# Patient Record
Sex: MALE | Race: WHITE | NOT HISPANIC OR LATINO | URBAN - METROPOLITAN AREA
[De-identification: names, ages, dates, MRNs, and addresses within clinical notes are randomized per-mention and may not be internally consistent; named-entity substitution may affect disease eponyms.]

---

## 2018-06-20 ENCOUNTER — OUTPATIENT (OUTPATIENT)
Dept: OUTPATIENT SERVICES | Facility: HOSPITAL | Age: 43
LOS: 1 days | End: 2018-06-20
Payer: COMMERCIAL

## 2018-06-20 VITALS
HEART RATE: 87 BPM | HEIGHT: 68 IN | TEMPERATURE: 97 F | RESPIRATION RATE: 16 BRPM | WEIGHT: 261.91 LBS | SYSTOLIC BLOOD PRESSURE: 138 MMHG | DIASTOLIC BLOOD PRESSURE: 90 MMHG

## 2018-06-20 DIAGNOSIS — M67.441 GANGLION, RIGHT HAND: ICD-10-CM

## 2018-06-20 DIAGNOSIS — Z98.890 OTHER SPECIFIED POSTPROCEDURAL STATES: Chronic | ICD-10-CM

## 2018-06-20 DIAGNOSIS — M67.40 GANGLION, UNSPECIFIED SITE: ICD-10-CM

## 2018-06-20 DIAGNOSIS — Z90.49 ACQUIRED ABSENCE OF OTHER SPECIFIED PARTS OF DIGESTIVE TRACT: Chronic | ICD-10-CM

## 2018-06-20 LAB
BUN SERPL-MCNC: 9 MG/DL — SIGNIFICANT CHANGE UP (ref 7–23)
CALCIUM SERPL-MCNC: 9.3 MG/DL — SIGNIFICANT CHANGE UP (ref 8.4–10.5)
CHLORIDE SERPL-SCNC: 102 MMOL/L — SIGNIFICANT CHANGE UP (ref 98–107)
CO2 SERPL-SCNC: 26 MMOL/L — SIGNIFICANT CHANGE UP (ref 22–31)
CREAT SERPL-MCNC: 0.69 MG/DL — SIGNIFICANT CHANGE UP (ref 0.5–1.3)
GLUCOSE SERPL-MCNC: 85 MG/DL — SIGNIFICANT CHANGE UP (ref 70–99)
HCT VFR BLD CALC: 37.8 % — LOW (ref 39–50)
HGB BLD-MCNC: 13.2 G/DL — SIGNIFICANT CHANGE UP (ref 13–17)
MCHC RBC-ENTMCNC: 28.2 PG — SIGNIFICANT CHANGE UP (ref 27–34)
MCHC RBC-ENTMCNC: 34.9 % — SIGNIFICANT CHANGE UP (ref 32–36)
MCV RBC AUTO: 80.8 FL — SIGNIFICANT CHANGE UP (ref 80–100)
NRBC # FLD: 0 — SIGNIFICANT CHANGE UP
PLATELET # BLD AUTO: 299 K/UL — SIGNIFICANT CHANGE UP (ref 150–400)
PMV BLD: 10.6 FL — SIGNIFICANT CHANGE UP (ref 7–13)
POTASSIUM SERPL-MCNC: 3.4 MMOL/L — LOW (ref 3.5–5.3)
POTASSIUM SERPL-SCNC: 3.4 MMOL/L — LOW (ref 3.5–5.3)
RBC # BLD: 4.68 M/UL — SIGNIFICANT CHANGE UP (ref 4.2–5.8)
RBC # FLD: 12.3 % — SIGNIFICANT CHANGE UP (ref 10.3–14.5)
SODIUM SERPL-SCNC: 140 MMOL/L — SIGNIFICANT CHANGE UP (ref 135–145)
WBC # BLD: 8.75 K/UL — SIGNIFICANT CHANGE UP (ref 3.8–10.5)
WBC # FLD AUTO: 8.75 K/UL — SIGNIFICANT CHANGE UP (ref 3.8–10.5)

## 2018-06-20 PROCEDURE — 93010 ELECTROCARDIOGRAM REPORT: CPT

## 2018-06-20 NOTE — H&P PST ADULT - PROBLEM SELECTOR PLAN 1
Pt given pre-op instructions and Famotidine and Chlorhexidine and verbalized understanding   OR Booking notified of Adhesive and Imuran allergy

## 2018-06-20 NOTE — H&P PST ADULT - MUSCULOSKELETAL COMMENTS
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

## 2018-06-20 NOTE — H&P PST ADULT - NEUROLOGICAL DETAILS
alert and oriented x 3/responds to pain/normal strength/responds to verbal commands/sensation intact

## 2018-06-20 NOTE — H&P PST ADULT - NEGATIVE ENMT SYMPTOMS
no ear pain/no hearing difficulty/no nasal congestion/no sinus symptoms/no throat pain/no dysphagia/no tinnitus/no vertigo

## 2018-06-20 NOTE — H&P PST ADULT - NEGATIVE NEUROLOGICAL SYMPTOMS
no generalized seizures/no tremors/no focal seizures/no transient paralysis/no paresthesias/no syncope/no weakness

## 2018-06-20 NOTE — H&P PST ADULT - NSANTHOSAYNRD_GEN_A_CORE
No. BENEDICTO screening performed.  STOP BANG Legend: 0-2 = LOW Risk; 3-4 = INTERMEDIATE Risk; 5-8 = HIGH Risk

## 2018-06-20 NOTE — H&P PST ADULT - SKIN/BREAST COMMENTS
Right finger mass for past month Right finger mass for past month - small and firm - located on nerve

## 2018-06-20 NOTE — H&P PST ADULT - HISTORY OF PRESENT ILLNESS
Pt is a Pt is a 42 yr old male scheduled for Excision mass Right Ring finger 6/25/18 with Dr Ruiz. Pt states nodule noted 1 month ago and has increased slightly in size and is on top of nerve. Pt hx of Crohns disease and hx of colon resection 1990's and followed by GI MD.

## 2018-06-24 ENCOUNTER — TRANSCRIPTION ENCOUNTER (OUTPATIENT)
Age: 43
End: 2018-06-24

## 2018-06-25 ENCOUNTER — OUTPATIENT (OUTPATIENT)
Dept: OUTPATIENT SERVICES | Facility: HOSPITAL | Age: 43
LOS: 1 days | Discharge: HOME | End: 2018-06-25
Payer: COMMERCIAL

## 2018-06-25 VITALS
TEMPERATURE: 98 F | HEART RATE: 75 BPM | SYSTOLIC BLOOD PRESSURE: 110 MMHG | DIASTOLIC BLOOD PRESSURE: 62 MMHG | OXYGEN SATURATION: 97 % | RESPIRATION RATE: 16 BRPM

## 2018-06-25 VITALS
RESPIRATION RATE: 16 BRPM | OXYGEN SATURATION: 97 % | WEIGHT: 261.91 LBS | DIASTOLIC BLOOD PRESSURE: 73 MMHG | HEIGHT: 67.72 IN | HEART RATE: 80 BPM | SYSTOLIC BLOOD PRESSURE: 95 MMHG | TEMPERATURE: 98 F

## 2018-06-25 DIAGNOSIS — Z90.49 ACQUIRED ABSENCE OF OTHER SPECIFIED PARTS OF DIGESTIVE TRACT: Chronic | ICD-10-CM

## 2018-06-25 DIAGNOSIS — Z98.890 OTHER SPECIFIED POSTPROCEDURAL STATES: Chronic | ICD-10-CM

## 2018-06-25 DIAGNOSIS — M67.441 GANGLION, RIGHT HAND: ICD-10-CM

## 2018-06-25 NOTE — BRIEF OPERATIVE NOTE - PROCEDURE
<<-----Click on this checkbox to enter Procedure Excision  06/25/2018  right ring finger mass  Active  IGAMMAL

## 2018-06-25 NOTE — ASU DISCHARGE PLAN (ADULT/PEDIATRIC). - NOTIFY
Fever greater than 101/Numbness, color, or temperature change to extremity/Swelling that continues/Bleeding that does not stop/Pain not relieved by Medications

## 2018-06-25 NOTE — ASU DISCHARGE PLAN (ADULT/PEDIATRIC). - NURSING INSTRUCTIONS
Narcotic pain medication may cause nausea or constipation. Take medication with food. Increase fluids and fiber intake. DO NOT TAKE TYLENOL WITH THE PAIN MEDICINE AS THERE IS TYLENOL IN THE PAIN MEDICINE.

## 2018-06-26 ENCOUNTER — RESULT REVIEW (OUTPATIENT)
Age: 43
End: 2018-06-26

## 2018-06-26 PROCEDURE — 88305 TISSUE EXAM BY PATHOLOGIST: CPT | Mod: 26

## 2018-06-27 LAB — SURGICAL PATHOLOGY STUDY: SIGNIFICANT CHANGE UP

## 2021-09-15 PROBLEM — F17.200 NICOTINE DEPENDENCE, UNSPECIFIED, UNCOMPLICATED: Chronic | Status: ACTIVE | Noted: 2018-06-20

## 2021-09-15 PROBLEM — M67.50: Chronic | Status: ACTIVE | Noted: 2018-06-20

## 2021-09-15 PROBLEM — E66.9 OBESITY, UNSPECIFIED: Chronic | Status: ACTIVE | Noted: 2018-06-20

## 2021-09-15 PROBLEM — K50.90 CROHN'S DISEASE, UNSPECIFIED, WITHOUT COMPLICATIONS: Chronic | Status: ACTIVE | Noted: 2018-06-20

## 2021-09-20 ENCOUNTER — APPOINTMENT (OUTPATIENT)
Dept: GASTROENTEROLOGY | Facility: CLINIC | Age: 46
End: 2021-09-20
Payer: COMMERCIAL

## 2021-09-20 VITALS
BODY MASS INDEX: 36.54 KG/M2 | HEIGHT: 71 IN | OXYGEN SATURATION: 98 % | TEMPERATURE: 97.5 F | HEART RATE: 100 BPM | WEIGHT: 261 LBS | DIASTOLIC BLOOD PRESSURE: 80 MMHG | SYSTOLIC BLOOD PRESSURE: 128 MMHG

## 2021-09-20 DIAGNOSIS — Z87.891 PERSONAL HISTORY OF NICOTINE DEPENDENCE: ICD-10-CM

## 2021-09-20 DIAGNOSIS — Z83.79 FAMILY HISTORY OF OTHER DISEASES OF THE DIGESTIVE SYSTEM: ICD-10-CM

## 2021-09-20 DIAGNOSIS — Z78.9 OTHER SPECIFIED HEALTH STATUS: ICD-10-CM

## 2021-09-20 DIAGNOSIS — M25.50 PAIN IN UNSPECIFIED JOINT: ICD-10-CM

## 2021-09-20 DIAGNOSIS — Z80.0 FAMILY HISTORY OF MALIGNANT NEOPLASM OF DIGESTIVE ORGANS: ICD-10-CM

## 2021-09-20 PROCEDURE — 99214 OFFICE O/P EST MOD 30 MIN: CPT

## 2021-09-20 NOTE — PHYSICAL EXAM
[General Appearance - Alert] : alert [General Appearance - In No Acute Distress] : in no acute distress [FreeTextEntry1] : Obese [Sclera] : the sclera and conjunctiva were normal [Extraocular Movements] : extraocular movements were intact [PERRL With Normal Accommodation] : pupils were equal in size, round, and reactive to light [Outer Ear] : the ears and nose were normal in appearance [Oropharynx] : the oropharynx was normal [Neck Appearance] : the appearance of the neck was normal [Neck Cervical Mass (___cm)] : no neck mass was observed [Jugular Venous Distention Increased] : there was no jugular-venous distention [Thyroid Diffuse Enlargement] : the thyroid was not enlarged [Heart Rate And Rhythm] : heart rate was normal and rhythm regular [Thyroid Nodule] : there were no palpable thyroid nodules [Heart Sounds] : normal S1 and S2 [Heart Sounds Gallop] : no gallops [Murmurs] : no murmurs [Full Pulse] : the pedal pulses are present [Heart Sounds Pericardial Friction Rub] : no pericardial rub [Edema] : there was no peripheral edema [Bowel Sounds] : normal bowel sounds [Abdomen Soft] : soft [Abdomen Tenderness] : non-tender [Abdomen Mass (___ Cm)] : no abdominal mass palpated [Cervical Lymph Nodes Enlarged Posterior Bilaterally] : posterior cervical [Cervical Lymph Nodes Enlarged Anterior Bilaterally] : anterior cervical [Supraclavicular Lymph Nodes Enlarged Bilaterally] : supraclavicular [Axillary Lymph Nodes Enlarged Bilaterally] : axillary [Femoral Lymph Nodes Enlarged Bilaterally] : femoral [Inguinal Lymph Nodes Enlarged Bilaterally] : inguinal [No CVA Tenderness] : no ~M costovertebral angle tenderness [No Spinal Tenderness] : no spinal tenderness [Abnormal Walk] : normal gait [Nail Clubbing] : no clubbing  or cyanosis of the fingernails [Musculoskeletal - Swelling] : no joint swelling seen [Skin Color & Pigmentation] : normal skin color and pigmentation [Motor Tone] : muscle strength and tone were normal [Skin Turgor] : normal skin turgor [] : no rash [Deep Tendon Reflexes (DTR)] : deep tendon reflexes were 2+ and symmetric [Sensation] : the sensory exam was normal to light touch and pinprick [No Focal Deficits] : no focal deficits [Oriented To Time, Place, And Person] : oriented to person, place, and time [Affect] : the affect was normal [Impaired Insight] : insight and judgment were intact

## 2021-09-20 NOTE — HISTORY OF PRESENT ILLNESS
[Heartburn] : stable heartburn [Nausea] : denies nausea [Vomiting] : denies vomiting [Diarrhea] : stable diarrhea [Constipation] : denies constipation [Yellow Skin Or Eyes (Jaundice)] : denies jaundice [Abdominal Pain] : stable abdominal pain [Rectal Pain] : denies rectal pain [Abdominal Swelling] : denies abdominal swelling [GERD] : gastroesophageal reflux disease [Hiatus Hernia] : hiatus hernia [Cholelithiasis] : cholelithiasis [Inflammatory Bowel Disease] : inflammatory bowel disease [Abdominal Surgery] : abdominal surgery [Appendectomy] : appendectomy [Cholecystectomy] : cholecystectomy [Wt Gain ___ Lbs] : no recent weight gain [Wt Loss ___ Lbs] : no recent weight loss [Peptic Ulcer Disease] : no peptic ulcer disease [Pancreatitis] : no pancreatitis [Kidney Stone] : no kidney stone [Irritable Bowel Syndrome] : no irritable bowel syndrome [Diverticulitis] : no diverticulitis [Alcohol Abuse] : no alcohol abuse [Malignancy] : no malignancy [de-identified] : His main complaint is 3-4 bowel movements a day better when he is using cholestyramine 5 g twice a day.  Diffuse muscle pain all over the body and also crampy abdominal pain which is better with dicyclomine 20 mg every 8 hourly as needed.  No blood in the stool.\par \par History of chronic heartburn, no dysphagia, no melena no epigastric pain.  He has been on maintenance omeprazole 40 mg a day.  Whenever he tries to stop medication he will get significant heartburn and epigastric discomfort.\par \par His last colonoscopy was in October 2020 and revealed moderate size internal and external hemorrhoids, mild diverticulosis of sigmoid colon at ileocolic anastomosis which is in right colon there was nodularity and active inflammation of ileal wall which involve half the anastomosis site there was also multiple shallow ulcers few millimeters to 1 cm in the neoterminal ileum which were biopsied rest of the colon was biopsied at about 10 cm interval.  His last blood work-up also was in October 2020 revealed normal CBC, normal CMP normal normal cholesterol levels.  Total cholesterol was 154 triglyceride was 113 HDL 42 LDL 91 and this was not on any medication hemoglobin A1c was 5.1, TSH 1.25 and free T4 1.14 both normal C-reactive protein was 4 hemoglobin was 14.2 and WBC count of 8200 platelet count of 339,000.\par \par Presently patient is on mesalamine 1.2 g 2 tablets a day cholestyramine 5 g p.o. twice a day omeprazole 40 mg a day.  Dicyclomine 20 mg every 8 hourly as needed for abdominal pain. [de-identified] : Patient had perforation of the small bowel requiring resection of the terminal ileum and ileostomy in July 1997 later on ileostomy was reversed and the ileocolic anastomosis in the right colon was made.  Since then he has chronic diarrhea.  Diagnosis of Crohn's disease was made at that time. [FreeTextEntry1] : Patient does suffer from the anxiety but has not required any medications, history of Crohn's disease involving neoterminal ileum maintained on mesalamine 2.4 g a day.  Chronic diarrhea with intermittent crampy abdominal pain.  Controlled with dicyclomine and cholestyramine.  There is no colon involvement.  Patient does have diverticulosis of sigmoid colon and symptoms of irritable bowel syndrome.  He had laparoscopic cholecystectomy for acute cholecystitis few years ago.  There is no history of hypertension, diabetes mellitus, MI, angina, CHF, TIA, CVA.  Patient had myelopathy about 15 years ago from the steroids given for the exacerbation of Crohn's disease at complete recovery but he still gets intermittent knee joint pains.  History of chronic GERD on maintenance omeprazole 40 mg a day becomes symptomatic of it.

## 2021-09-20 NOTE — ASSESSMENT
[FreeTextEntry1] : Crohn's ileitis.  Chronic diarrhea.  Irritable bowel syndrome.  Diverticulosis of the sigmoid colon.  Post cholecystectomy.  Post resection of terminal ileum with ileocolic anastomosis and recurrence of disease at anastomosis.  Arthralgia.  GERD.\par \par Continue on mesalamine 2.4 g a day.  Omeprazole 40 mg a day.  Cholestyramine 5 g twice a day, dicyclomine 20 mg every 8 hourly as needed.\par \par Surveillance colonoscopy in 2023.

## 2022-08-08 ENCOUNTER — APPOINTMENT (OUTPATIENT)
Dept: GASTROENTEROLOGY | Facility: CLINIC | Age: 47
End: 2022-08-08

## 2022-08-08 VITALS
HEIGHT: 71 IN | WEIGHT: 272 LBS | TEMPERATURE: 97.8 F | SYSTOLIC BLOOD PRESSURE: 180 MMHG | DIASTOLIC BLOOD PRESSURE: 90 MMHG | BODY MASS INDEX: 38.08 KG/M2

## 2022-08-08 DIAGNOSIS — E66.9 OBESITY, UNSPECIFIED: ICD-10-CM

## 2022-08-08 DIAGNOSIS — Z12.11 ENCOUNTER FOR SCREENING FOR MALIGNANT NEOPLASM OF COLON: ICD-10-CM

## 2022-08-08 DIAGNOSIS — Z80.0 ENCOUNTER FOR SCREENING FOR MALIGNANT NEOPLASM OF COLON: ICD-10-CM

## 2022-08-08 PROCEDURE — 99214 OFFICE O/P EST MOD 30 MIN: CPT

## 2022-08-08 RX ORDER — CHOLESTYRAMINE 4 G/9G
4 POWDER, FOR SUSPENSION ORAL
Refills: 0 | Status: DISCONTINUED | COMMUNITY
End: 2022-08-08

## 2022-08-08 RX ORDER — MESALAMINE 1.2 G/1
1.2 TABLET, DELAYED RELEASE ORAL
Qty: 120 | Refills: 5 | Status: ACTIVE | COMMUNITY
Start: 2021-06-02 | End: 1900-01-01

## 2022-08-08 NOTE — ASSESSMENT
[FreeTextEntry1] : Impression: Crohn's ileocolitis status post ileocolectomy.  IBS-D.  GERD.  All symptoms active due to running out of medication.  Family should colon cancer last colonoscopy 2020 with 3-year recall.\par \par Plan: We will renew dicyclomine, mesalamine, cholestyramine, and omeprazole 40 mg.  Patient may take 1.2 g mesalamine for the time being.  Patient has not had Crohn's colitis to the extent where surveillance colonoscopies would be necessary; however, should get a colonoscopy every 3 to 5 years due to his family history.

## 2022-08-08 NOTE — PHYSICAL EXAM
[General Appearance - Alert] : alert [General Appearance - In No Acute Distress] : in no acute distress [Auscultation Breath Sounds / Voice Sounds] : lungs were clear to auscultation bilaterally [Heart Rate And Rhythm] : heart rate was normal and rhythm regular [Heart Sounds] : normal S1 and S2 [Heart Sounds Gallop] : no gallops [Murmurs] : no murmurs [Heart Sounds Pericardial Friction Rub] : no pericardial rub [Full Pulse] : the pedal pulses are present [Edema] : there was no peripheral edema [Bowel Sounds] : normal bowel sounds [Abdomen Soft] : soft [Abdomen Tenderness] : non-tender [] : no hepato-splenomegaly [Abdomen Mass (___ Cm)] : no abdominal mass palpated [FreeTextEntry1] : Well-healed surgical scars.
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
## 2022-10-13 ENCOUNTER — APPOINTMENT (OUTPATIENT)
Dept: GASTROENTEROLOGY | Facility: CLINIC | Age: 47
End: 2022-10-13

## 2022-10-13 ENCOUNTER — RX RENEWAL (OUTPATIENT)
Age: 47
End: 2022-10-13

## 2022-10-13 VITALS
HEIGHT: 71 IN | OXYGEN SATURATION: 99 % | WEIGHT: 275 LBS | TEMPERATURE: 97.5 F | SYSTOLIC BLOOD PRESSURE: 172 MMHG | BODY MASS INDEX: 38.5 KG/M2 | HEART RATE: 107 BPM | DIASTOLIC BLOOD PRESSURE: 110 MMHG

## 2022-10-13 DIAGNOSIS — K50.019 CROHN'S DISEASE OF SMALL INTESTINE WITH UNSPECIFIED COMPLICATIONS: ICD-10-CM

## 2022-10-13 DIAGNOSIS — K58.0 IRRITABLE BOWEL SYNDROME WITH DIARRHEA: ICD-10-CM

## 2022-10-13 DIAGNOSIS — K57.30 DIVERTICULOSIS OF LARGE INTESTINE W/OUT PERFORATION OR ABSCESS W/OUT BLEEDING: ICD-10-CM

## 2022-10-13 DIAGNOSIS — I10 ESSENTIAL (PRIMARY) HYPERTENSION: ICD-10-CM

## 2022-10-13 DIAGNOSIS — K21.9 GASTRO-ESOPHAGEAL REFLUX DISEASE W/OUT ESOPHAGITIS: ICD-10-CM

## 2022-10-13 DIAGNOSIS — K52.9 NONINFECTIVE GASTROENTERITIS AND COLITIS, UNSPECIFIED: ICD-10-CM

## 2022-10-13 PROCEDURE — 99214 OFFICE O/P EST MOD 30 MIN: CPT

## 2022-10-13 RX ORDER — DICYCLOMINE HYDROCHLORIDE 20 MG/1
20 TABLET ORAL
Qty: 270 | Refills: 3 | Status: ACTIVE | COMMUNITY
Start: 2022-10-13 | End: 1900-01-01

## 2022-10-13 RX ORDER — OMEPRAZOLE 40 MG/1
40 CAPSULE, DELAYED RELEASE ORAL
Qty: 90 | Refills: 3 | Status: ACTIVE | COMMUNITY

## 2022-10-13 RX ORDER — AMLODIPINE BESYLATE 5 MG/1
5 TABLET ORAL
Refills: 0 | Status: ACTIVE | COMMUNITY

## 2022-10-13 NOTE — HISTORY OF PRESENT ILLNESS
[FreeTextEntry1] : Patient was scheduled to have surgery on injured wrist but found to have hypertension and referred to cardiology for management.  Was started on amlodipine 2.5 mg and was recently increased to 5 mg.  BP still running high.  Currently taking mesalamine 4.8 g for Crohn's maintenance.  Pharmacy never received prescriptions for omeprazole and dicyclomine.  Patient complains of worsening dyspeptic symptoms and more frequent bowel movements.  Increase cholestyramine to 4 times daily for now.  Taking OTC Prilosec in place of prescription strength for now.  Has an appointment with new PMD.\par \par Patient is status postcholecystectomy and status post ileocolectomy.  Family history of colon cancer due for follow-up colonoscopy 2023.  Extent of Crohn's does not warrant annual surveillance colonoscopy.

## 2022-10-13 NOTE — ASSESSMENT
[FreeTextEntry1] : Impression: Exacerbation of GERD probably stress related also had run out of omeprazole taking OTC version rather than prescription.  Exacerbation of IBS mainly because he ran out of dicyclomine.  Hypertension being managed by cardiology.  Crohn's appears to be in remission on mesalamine 4.8 g.\par \par Plan: Resubmit prescriptions as above.  Follow-up with cardiology for hypertension management.  Send labs including C-reactive protein, CBC, B12, folate, vitamin D.  Patient request blood type although will not be covered by insurance.  Repeat colonoscopy 2023, but wrist surgery takes priority and will wait till that is complete and he is fully recovered before scheduling.

## 2023-10-13 ENCOUNTER — RX RENEWAL (OUTPATIENT)
Age: 48
End: 2023-10-13

## 2023-10-13 RX ORDER — CHOLESTYRAMINE 4 G/9G
4 POWDER, FOR SUSPENSION ORAL TWICE DAILY
Qty: 180 | Refills: 3 | Status: ACTIVE | COMMUNITY
Start: 2022-08-08 | End: 1900-01-01

## 2024-11-26 ENCOUNTER — APPOINTMENT (OUTPATIENT)
Dept: GASTROENTEROLOGY | Facility: CLINIC | Age: 49
End: 2024-11-26
Payer: COMMERCIAL

## 2024-11-26 VITALS
OXYGEN SATURATION: 98 % | BODY MASS INDEX: 40.88 KG/M2 | TEMPERATURE: 96.9 F | HEART RATE: 90 BPM | DIASTOLIC BLOOD PRESSURE: 60 MMHG | SYSTOLIC BLOOD PRESSURE: 120 MMHG | WEIGHT: 292 LBS | HEIGHT: 71 IN

## 2024-11-26 DIAGNOSIS — Z91.89 OTHER SPECIFIED PERSONAL RISK FACTORS, NOT ELSEWHERE CLASSIFIED: ICD-10-CM

## 2024-11-26 DIAGNOSIS — K57.30 DIVERTICULOSIS OF LARGE INTESTINE W/OUT PERFORATION OR ABSCESS W/OUT BLEEDING: ICD-10-CM

## 2024-11-26 DIAGNOSIS — K50.019 CROHN'S DISEASE OF SMALL INTESTINE WITH UNSPECIFIED COMPLICATIONS: ICD-10-CM

## 2024-11-26 DIAGNOSIS — K21.9 GASTRO-ESOPHAGEAL REFLUX DISEASE W/OUT ESOPHAGITIS: ICD-10-CM

## 2024-11-26 DIAGNOSIS — K52.9 NONINFECTIVE GASTROENTERITIS AND COLITIS, UNSPECIFIED: ICD-10-CM

## 2024-11-26 DIAGNOSIS — Z80.0 ENCOUNTER FOR SCREENING FOR MALIGNANT NEOPLASM OF COLON: ICD-10-CM

## 2024-11-26 DIAGNOSIS — I10 ESSENTIAL (PRIMARY) HYPERTENSION: ICD-10-CM

## 2024-11-26 DIAGNOSIS — K58.0 IRRITABLE BOWEL SYNDROME WITH DIARRHEA: ICD-10-CM

## 2024-11-26 DIAGNOSIS — Z12.11 ENCOUNTER FOR SCREENING FOR MALIGNANT NEOPLASM OF COLON: ICD-10-CM

## 2024-11-26 PROCEDURE — 99214 OFFICE O/P EST MOD 30 MIN: CPT

## 2024-11-26 RX ORDER — LOSARTAN POTASSIUM AND HYDROCHLOROTHIAZIDE 25; 100 MG/1; MG/1
100-25 TABLET ORAL
Refills: 0 | Status: ACTIVE | COMMUNITY

## 2024-11-26 RX ORDER — SODIUM SULFATE, POTASSIUM SULFATE AND MAGNESIUM SULFATE 1.6; 3.13; 17.5 G/177ML; G/177ML; G/177ML
17.5-3.13-1.6 SOLUTION ORAL
Qty: 1 | Refills: 0 | Status: ACTIVE | COMMUNITY
Start: 2024-11-26 | End: 1900-01-01

## 2024-12-11 ENCOUNTER — TRANSCRIPTION ENCOUNTER (OUTPATIENT)
Age: 49
End: 2024-12-11

## 2025-02-04 ENCOUNTER — APPOINTMENT (OUTPATIENT)
Dept: GASTROENTEROLOGY | Facility: CLINIC | Age: 50
End: 2025-02-04
Payer: COMMERCIAL

## 2025-02-04 VITALS
HEIGHT: 71 IN | DIASTOLIC BLOOD PRESSURE: 90 MMHG | WEIGHT: 288 LBS | BODY MASS INDEX: 40.32 KG/M2 | OXYGEN SATURATION: 98 % | TEMPERATURE: 97.7 F | SYSTOLIC BLOOD PRESSURE: 122 MMHG | HEART RATE: 88 BPM

## 2025-02-04 DIAGNOSIS — K50.019 CROHN'S DISEASE OF SMALL INTESTINE WITH UNSPECIFIED COMPLICATIONS: ICD-10-CM

## 2025-02-04 DIAGNOSIS — R10.13 EPIGASTRIC PAIN: ICD-10-CM

## 2025-02-04 DIAGNOSIS — K57.30 DIVERTICULOSIS OF LARGE INTESTINE W/OUT PERFORATION OR ABSCESS W/OUT BLEEDING: ICD-10-CM

## 2025-02-04 DIAGNOSIS — R11.2 NAUSEA WITH VOMITING, UNSPECIFIED: ICD-10-CM

## 2025-02-04 DIAGNOSIS — K52.9 NONINFECTIVE GASTROENTERITIS AND COLITIS, UNSPECIFIED: ICD-10-CM

## 2025-02-04 DIAGNOSIS — K21.9 GASTRO-ESOPHAGEAL REFLUX DISEASE W/OUT ESOPHAGITIS: ICD-10-CM

## 2025-02-04 DIAGNOSIS — K58.0 IRRITABLE BOWEL SYNDROME WITH DIARRHEA: ICD-10-CM

## 2025-02-04 DIAGNOSIS — A09 INFECTIOUS GASTROENTERITIS AND COLITIS, UNSPECIFIED: ICD-10-CM

## 2025-02-04 DIAGNOSIS — R19.7 DIARRHEA, UNSPECIFIED: ICD-10-CM

## 2025-02-04 DIAGNOSIS — E66.9 OBESITY, UNSPECIFIED: ICD-10-CM

## 2025-02-04 DIAGNOSIS — Z91.89 OTHER SPECIFIED PERSONAL RISK FACTORS, NOT ELSEWHERE CLASSIFIED: ICD-10-CM

## 2025-02-04 PROCEDURE — 99214 OFFICE O/P EST MOD 30 MIN: CPT

## 2025-02-14 ENCOUNTER — APPOINTMENT (OUTPATIENT)
Dept: GASTROENTEROLOGY | Facility: AMBULATORY MEDICAL SERVICES | Age: 50
End: 2025-02-14
Payer: COMMERCIAL

## 2025-02-14 PROCEDURE — 45378 DIAGNOSTIC COLONOSCOPY: CPT | Mod: PT

## 2025-02-14 PROCEDURE — 43235 EGD DIAGNOSTIC BRUSH WASH: CPT | Mod: 59

## 2025-02-20 NOTE — H&P PST ADULT - PROBLEM SELECTOR PROBLEM 1
Oncology follow-up visit:  Date on this visit: 2/20/25     Diagnosis.  Esophageal cancer.    Primary Physician: Clinic, [facility]     History Of Present Illness:  Ms. Mustafa is a 66 year old female who presents with new diagnosis of esophageal cancer.  I initially saw her on 4/18/2024.  Please see my previous note for details.  I have copied and updated from prior notes.      She mentioned that since November 2023 she started to notice pain upon swallowing and it was basically pain which limited her food intake.  This was progressively getting worse.  She had further workup as mentioned below.    2/15/2024.  EGD showed an ulcerated mid esophageal mass extending from 26-31 cm from the incisors.  There was a short segment Auguste's esophagus from 37-40 cm (biopsy-proven).  Pathology from the mid esophageal mass showed moderately differentiated invasive squamous cell carcinoma, MMR IHC intact.    3/7/2024.  PET/CT showed markedly FDG avid wall thickening of the mid thoracic esophagus with SUV max 24.1.  No evidence of metastatic disease.    3/21/2024.  Upper EUS.  Endoscopy showed a flat nodule in the proximal esophagus between 15-19 cm and one third circumferential.  Upper esophageal sphincter was at 14 cm.  Biopsies from this showed high-grade dysplasia/carcinoma in situ.      Normal esophageal squamous cell cancer causing maybe esophagus on the right anterolateral esophageal wall between 24-30 cm.  It was 50% circumferential.  The GE junction was at 35 cm with 2 cm tongues of Auguste's anteriorly without high risk features.    Ultrasound exam showed the mid esophageal tumor to be probably T3. Two 4 x 6 mm nodes were seen adjacent to the distal esophageal wall at 36 and 37 cm.  Both an identical appearance and one was sampled.  This lymph node biopsy was benign.    By EUS it was staged as T3 N0 M0 tumor.    4/2/2024.  Because of finding of carcinoma in situ in the proximal esophagus, he had endoscopic  resection of the proximal squamous cell carcinoma in situ lesion performed with en bloc removal.    The final pathology showed squamous mucosa with high-grade dysplasia/carcinoma in situ with all margins negative for dysplasia.  No definite invasion was identified. ESD was considered curative for this lesion.    Her case was also discussed in the tumor conference with the plan to proceed with neoadjuvant chemotherapy/radiation followed by definitive surgery.      She met with Dr. Martinez.    She started on concurrent chemoradiation with carboplatin and Taxol on 5/1/24 and received 5 doses, last on 5/28/24 and last dose of radiation on 6/5/24.     She was hospitalized from 6/4-6/18/24 with radiation esophagitis causing pain and inability to eat. She was discharged home with a G-tube that was placed on 6/14/24.     Repeat PET/CT on 8/15/2024 showed very good response to treatment.  There is complete resolution of FDG avidity in the esophagus.  No evidence of malignancy.    She met with Dr. Martinez and decision was made not to pursue surgery for now.    Feeding tube was removed in September 2024.      Interval history.    She comes in today accompanied by her daughter.  I also reviewed notes from Dr. Mansoor Eric from palliative care and thoracic surgery from Dr. Martinez.      She tells me that now she is able to eat better.  She has gained some weight.  She feels nauseous in the morning.  She takes Compazine twice a day and Zofran as needed.  Bowel movements are fine.  Denies any pain.  She is smoking half a pack a day.  Denies any neuropathy.         ECOG 1    ROS:  A comprehensive ROS was otherwise neg    I reviewed other history in epic as below.    Past Medical/Surgical History:  Past Medical History:   Diagnosis Date    Hyperlipidemia     Hypertension    Hypothyroidism- hx of PATEL at the age of 18 for hyperthyroidism    Past Surgical History:   Procedure Laterality Date    BIOPSY BREAST Right     age 32 benign  fibrous    CV CORONARY ANGIOGRAM N/A 01/25/2023    Procedure: Coronary Angiogram;  Surgeon: Lukas Irizarry MD;  Location: [facility] CV    CV LEFT HEART CATH N/A 01/25/2023    Procedure: Left Heart Catheterization;  Surgeon: Lukas Irizarry MD;  Location: [facility] LAB CV    ENDOSCOPIC ULTRASOUND UPPER GASTROINTESTINAL TRACT (GI) N/A 3/21/2024    Procedure: ENDOSCOPIC ULTRASOUND, ESOPHAGOSCOPY / UPPER GASTROINTESTINAL TRACT (GI), ENDOSCOPY WITH BIOPSY, FINE NEEDLE ASPIRATION;  Surgeon: Damon Kramer MD;  Location: [facility] OR    ENDOSCOPIC ULTRASOUND, ESOPHAGOSCOPY / UPPER GASTROINTESTINAL TRACT (GI), ENDOSCOPY WITH BIOPSY, FINE NEEDLE ASPIRATION  03/21/2024    ESOPHAGOSCOPY, GASTROSCOPY, DUODENOSCOPY (EGD), COMBINED N/A 6/6/2024    Procedure: Esophagoscopy, Gastroscopy, Duodenoscopy (EGD), Nasojejunal Feeding Tube Placement;  Surgeon: Bryant Martinez MD;  Location: [facility] OR    ESOPHAGOSCOPY, GASTROSCOPY, DUODENOSCOPY (EGD), SUBMUCOSA RESECTION N/A 4/2/2024    Procedure: ESOPHAGOGASTRODUODENOSCOPY, WITH SUBMUCOSAL RESECTION;  Surgeon: Holden King MD;  Location: [facility] OR    IR CHEST PORT PLACEMENT > 5 YRS OF AGE  4/24/2024    LAPAROSCOPIC ASSISTED INSERTION TUBE GASTROTOMY N/A 6/14/2024    Procedure: Upper endoscopy, laparoscopic gastrostomy tube placement;  Surgeon: Bryant Martinez MD;  Location: [facility] OR     Cancer History:   As above    Allergies:  Allergies as of 02/20/2025 - Reviewed 02/20/2025   Allergen Reaction Noted    Amoxicillin Shortness Of Breath, Itching, and Rash 12/06/2023    Penicillins  03/14/2024     Current Medications:  Current Outpatient Medications   Medication Sig Dispense Refill    artificial saliva (BIOTENE DRY MOUTHWASH) LIQD liquid Swish and spit 15 mLs in mouth 4 times daily as needed for dry mouth 473 mL 0    Calcium Citrate-Vitamin D (CALCIUM CITRATE CHEWY BITE) 500-12.5 MG-MCG CHEW Take 1 tablet by mouth 2 times daily. 180 tablet 3    dextran  70-hypromellose (TEARS NATURALE FREE PF) 0.1-0.3 % ophthalmic solution Place 1 drop into both eyes daily as needed (Irritation). 35 each 0    esomeprazole (NEXIUM) 20 MG DR capsule Take 1 capsule (20 mg) by mouth daily. 90 capsule 1    hydrOXYzine HCl (ATARAX) 25 MG tablet TAKE 1-2 TABLETS (25-50 MG) BY MOUTH 3 TIMES DAILY AS NEEDED FOR ITCHING 540 tablet 1    levothyroxine (SYNTHROID/LEVOTHROID) 100 MCG tablet Take 1 tablet (100 mcg) by mouth daily. 90 tablet 3    methocarbamol (ROBAXIN) 750 MG tablet Take 1 tablet (750 mg) by mouth 4 times daily 120 tablet 0    naloxone (NARCAN) 4 MG/0.1ML nasal spray Spray 1 spray (4 mg) into one nostril alternating nostrils as needed for opioid reversal every 2-3 minutes until assistance arrives 0.2 mL 1    nicotine (NICODERM CQ) 7 MG/24HR 24 hr patch Place 1 patch onto the skin every 24 hours Start after 6 weeks of the 14 mg/24 hr dose 30 patch 0    ondansetron (ZOFRAN ODT) 8 MG ODT tab Take 1 tablet (8 mg) by mouth every 8 hours as needed for nausea. 90 tablet 1    oxyCODONE (ROXICODONE) 5 MG/5ML solution Take 3-4 mLs (3-4 mg) by mouth or Feeding Tube nightly as needed for severe pain. 80 mL 0    prochlorperazine (COMPAZINE) 10 MG tablet TAKE 1 TABLET (10 MG) BY MOUTH EVERY 6 HOURS AS NEEDED FOR VOMITING. 90 tablet 1    rOPINIRole (REQUIP) 1 MG tablet Take 1.5-2 tablets (1.5-2 mg) by mouth at bedtime. 60 tablet 3    sertraline (ZOLOFT) 50 MG tablet Take 1 tablet (50 mg) by mouth daily. 90 tablet 1    vitamin D3 (CHOLECALCIFEROL) 50 mcg (2000 units) tablet Take 1 tablet (50 mcg) by mouth daily. 90 tablet 3    hydrOXYzine (VISTARIL) 50 MG capsule Take 50 mg by mouth 2 times daily as needed.      nicotine (NICODERM CQ) 14 MG/24HR 24 hr patch Place 1 patch onto the skin every 24 hours (Patient not taking: Reported on 2/20/2025) 30 patch 1      Family History:  Family History   Problem Relation Age of Onset    Chronic Obstructive Pulmonary Disease Father     Colon Cancer Paternal  Grandmother 70     1 daughter- healthy    Social History:  Social History     Socioeconomic History    Marital status: Single     Spouse name: Not on file    Number of children: Not on file    Years of education: Not on file    Highest education level: Not on file   Occupational History    Not on file   Tobacco Use    Smoking status: Former     Current packs/day: 1.00     Average packs/day: 1 pack/day for 48.1 years (48.1 ttl pk-yrs)     Types: Cigarettes     Start date: 1977     Passive exposure: Current    Smokeless tobacco: Never   Vaping Use    Vaping status: Never Used   Substance and Sexual Activity    Alcohol use: Not on file     Comment: occasional    Drug use: Never    Sexual activity: Not on file   Other Topics Concern    Not on file   Social History Narrative    ** Merged History Encounter **          Social Drivers of Health     Financial Resource Strain: Not on file   Food Insecurity: No Food Insecurity (2/24/2024)    Received from [facility]    Hunger Vital Sign     Worried About Running Out of Food in the Last Year: Never true     Ran Out of Food in the Last Year: Never true   Transportation Needs: No Transportation Needs (2/24/2024)    Received from [facility]    PRAPARE - Transportation     Lack of Transportation (Medical): No     Lack of Transportation (Non-Medical): No   Physical Activity: Inactive (2/24/2024)    Received from [facility]    Exercise Vital Sign     Days of Exercise per Week: 0 days     Minutes of Exercise per Session: 0 min   Stress: Not on file   Social Connections: Not on file   Interpersonal Safety: Not on file   Housing Stability: Low Risk  (2/24/2024)    Received from [facility]    Housing Stability     What is your living situation today?: I have a steady place to live   Chronic smoker- now smokes occasionally. No etoh. Lives alone. Currently brother staying with her        Physical Exam:  BP (!) 157/83 (BP Location:  Right arm, Patient Position: Sitting, Cuff Size: Adult Regular)   Pulse 71   Temp 97.7  F (36.5  C) (Oral)   Resp 20   Wt 69.9 kg (154 lb)   SpO2 98%   BMI 24.12 kg/m      Wt Readings from Last 4 Encounters:   02/20/25 69.9 kg (154 lb)   02/14/25 68.9 kg (152 lb)   12/13/24 63.5 kg (140 lb)   11/08/24 63 kg (139 lb)     CONSTITUTIONAL: no acute distress  EYES: PERRLA, no palor or icterus.   ENT/MOUTH: no mouth lesions. Ears normal   RESPIRATORY: Breathing is comfortable  GI: soft non tender no hepatosplenomegaly  NEURO: AAOX3  Grossly non focal neuro exam  INTEGUMENT: no obvious rashes  LYMPHATIC: no palpable cervical, supraclavicular, axillary LAD  MUSCULOSKELETAL: Unremarkable. No bony tenderness.   EXTREMITIES: no edema  PSYCH: Mentation, mood and affect are normal. Decision making capacity is intact        Laboratory/Imaging Studies      Reviewed    2/6/2025  CBC shows WBC 8.8.  Hemoglobin 10.2.  Platelets 254.   Chemistry unremarkable.  TSH 3.58    8/7/2024  CBC showed WBC 8.8.  Hemoglobin 10.2.  Platelets 254.    CT chest abdomen and pelvis 2/13/2025  FINDINGS:   LUNGS AND PLEURA: Emphysematous change in both lungs. No nodules or masses.     MEDIASTINUM/AXILLAE: No mediastinal mass or adenopathy. Mild thickening along the mid esophagus is noted stable.     CORONARY ARTERY CALCIFICATION: Moderate.     HEPATOBILIARY: Multiple small low-attenuation lesions in the liver the largest in segment 8 measuring 1 cm stable from previous examination. Cholelithiasis.     PANCREAS: Pancreas is normal.     SPLEEN: Normal.     ADRENAL GLANDS: Bilateral adrenal adenomas stable.     KIDNEYS/BLADDER: No stones in either kidney. No hydronephrosis.     BOWEL: Few colonic diverticula.     LYMPH NODES: No abnormal adenopathy in the abdomen or pelvis.     VASCULATURE: Atherosclerotic plaque throughout the aorta and iliac vessels.     PELVIC ORGANS: Normal.     MUSCULOSKELETAL: No skeletal lesions.                                                                       IMPRESSION:  1.  No definite change from previous.     2.  Small low-attenuation lesions in the liver stable possibly cysts.     3.  Mild thickening of the wall of the mid esophagus stable.     4.  No new findings.    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  MMR IHC intact.      She started on concurrent chemoradiation with carboplatin and Taxol on 5/1/24 and received 5 doses, last on 5/28/24 and last dose of radiation on 6/5/24.    Repeat PET/CT on 8/15/2024 showed complete resolution of FDG avidity in the esophagus.    At that time she met with Dr. Martinez and decision was made not to pursue the surgery.  Her feeding tube was removed.      She had a repeat CT chest abdomen and pelvis on 2/13/2025 which I reviewed myself.  It seems stable with mild stable thickening of the mid esophagus.  No new lesions concerning for recurrence/metastasis are seen.     Nutrition.  She was on tube feeds but now she is able to eat better.  She was losing weight but now has started to gain weight which is good news.  She does not have any dysphagia or odynophagia anymore.    Anemia.  This was improving after treatment.  We will give her more time to recover and repeat labs in 6 months.    Nausea.  She usually feels this in the morning.  She is takes Compazine twice a day and as needed Zofran.  She is following with Dr. Mansoor Eric.      Smoking.  She quit for some time but now smoking again and she smokes on average half a pack a day.  We again discussed about this and I gave her a referral for smoking cessation program.      Port-A-Cath.  She will need port flushes every 8 weeks or so.      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  Now s/p endoscopic resection with clear margins free of dysplasia.  This has been adequately treated.  Continue to  observe.      Macrocytosis.  Previously she had macrocytosis.  B12 and folate were normal.  Free T4 was normal.  TSH was low.  Continue to observe.     I will see her back in 6 months with labs/CT scan prior    I answered all of her and her daughter's questions to their satisfaction.  They are agreeable and comfortable with the plan.    Kathy Burch MD     The longitudinal plan of care for the esophageal cancer, as documented were addressed during this visit. Due to the added complexity in care, I will continue to support Lita in the subsequent management and with ongoing continuity of care.       [place]